# Patient Record
Sex: MALE | Race: BLACK OR AFRICAN AMERICAN
[De-identification: names, ages, dates, MRNs, and addresses within clinical notes are randomized per-mention and may not be internally consistent; named-entity substitution may affect disease eponyms.]

---

## 2022-05-08 ENCOUNTER — HOSPITAL ENCOUNTER (EMERGENCY)
Dept: HOSPITAL 26 - MED | Age: 32
LOS: 1 days | Discharge: PSYCHIATRIC OR SUBSTANCE USE FACILITY | End: 2022-05-09
Payer: COMMERCIAL

## 2022-05-08 VITALS — BODY MASS INDEX: 44.1 KG/M2 | WEIGHT: 315 LBS | HEIGHT: 71 IN

## 2022-05-08 VITALS — DIASTOLIC BLOOD PRESSURE: 75 MMHG | SYSTOLIC BLOOD PRESSURE: 116 MMHG

## 2022-05-08 DIAGNOSIS — F19.10: ICD-10-CM

## 2022-05-08 DIAGNOSIS — Z88.2: ICD-10-CM

## 2022-05-08 DIAGNOSIS — F12.129: ICD-10-CM

## 2022-05-08 DIAGNOSIS — F23: Primary | ICD-10-CM

## 2022-05-08 DIAGNOSIS — F31.9: ICD-10-CM

## 2022-05-08 DIAGNOSIS — F90.9: ICD-10-CM

## 2022-05-08 DIAGNOSIS — R45.851: ICD-10-CM

## 2022-05-08 DIAGNOSIS — Z20.822: ICD-10-CM

## 2022-05-08 DIAGNOSIS — Z88.8: ICD-10-CM

## 2022-05-08 LAB
ALBUMIN FLD-MCNC: 3.5 G/DL (ref 3.4–5)
ANION GAP SERPL CALCULATED.3IONS-SCNC: 9.9 MMOL/L (ref 8–16)
APAP SERPL-MCNC: 2 UG/ML (ref 10–30)
AST SERPL-CCNC: 95 U/L (ref 15–37)
BASOPHILS # BLD AUTO: 0.1 K/UL (ref 0–0.22)
BASOPHILS NFR BLD AUTO: 0.5 % (ref 0–2)
BILIRUB SERPL-MCNC: 1.9 MG/DL (ref 0–1)
BUN SERPL-MCNC: 10 MG/DL (ref 7–18)
CHLORIDE SERPL-SCNC: 105 MMOL/L (ref 98–107)
CO2 SERPL-SCNC: 29.8 MMOL/L (ref 21–32)
CREAT SERPL-MCNC: 1.1 MG/DL (ref 0.6–1.3)
EOSINOPHIL # BLD AUTO: 0.3 K/UL (ref 0–0.4)
EOSINOPHIL NFR BLD AUTO: 3.2 % (ref 0–4)
ERYTHROCYTE [DISTWIDTH] IN BLOOD BY AUTOMATED COUNT: 15.1 % (ref 11.6–13.7)
GFR SERPL CREATININE-BSD FRML MDRD: 100 ML/MIN (ref 90–?)
GLUCOSE SERPL-MCNC: 107 MG/DL (ref 74–106)
HCT VFR BLD AUTO: 38.3 % (ref 36–52)
HGB BLD-MCNC: 12.8 G/DL (ref 12–18)
LYMPHOCYTES # BLD AUTO: 2 K/UL (ref 2–11.5)
LYMPHOCYTES NFR BLD AUTO: 20.2 % (ref 20.5–51.1)
MCH RBC QN AUTO: 30 PG (ref 27–31)
MCHC RBC AUTO-ENTMCNC: 33 G/DL (ref 33–37)
MCV RBC AUTO: 90.2 FL (ref 80–94)
MONOCYTES # BLD AUTO: 1.1 K/UL (ref 0.8–1)
MONOCYTES NFR BLD AUTO: 11.6 % (ref 1.7–9.3)
NEUTROPHILS # BLD AUTO: 6.3 K/UL (ref 1.8–7.7)
NEUTROPHILS NFR BLD AUTO: 64.5 % (ref 42.2–75.2)
PLATELET # BLD AUTO: 247 K/UL (ref 140–450)
POTASSIUM SERPL-SCNC: 3.7 MMOL/L (ref 3.5–5.1)
RBC # BLD AUTO: 4.25 MIL/UL (ref 4.2–6.1)
SALICYLATES SERPL-MCNC: < 2.8 MG/DL (ref 2.8–20)
SODIUM SERPL-SCNC: 141 MMOL/L (ref 136–145)
WBC # BLD AUTO: 9.7 K/UL (ref 4.8–10.8)

## 2022-05-08 PROCEDURE — 84484 ASSAY OF TROPONIN QUANT: CPT

## 2022-05-08 PROCEDURE — 80053 COMPREHEN METABOLIC PANEL: CPT

## 2022-05-08 PROCEDURE — 87426 SARSCOV CORONAVIRUS AG IA: CPT

## 2022-05-08 PROCEDURE — 80305 DRUG TEST PRSMV DIR OPT OBS: CPT

## 2022-05-08 PROCEDURE — 85025 COMPLETE CBC W/AUTO DIFF WBC: CPT

## 2022-05-08 PROCEDURE — 81003 URINALYSIS AUTO W/O SCOPE: CPT

## 2022-05-08 PROCEDURE — 36415 COLL VENOUS BLD VENIPUNCTURE: CPT

## 2022-05-08 PROCEDURE — G0482 DRUG TEST DEF 15-21 CLASSES: HCPCS

## 2022-05-08 PROCEDURE — G0480 DRUG TEST DEF 1-7 CLASSES: HCPCS

## 2022-05-08 PROCEDURE — 99285 EMERGENCY DEPT VISIT HI MDM: CPT

## 2022-05-08 PROCEDURE — 76705 ECHO EXAM OF ABDOMEN: CPT

## 2022-05-08 PROCEDURE — U0003 INFECTIOUS AGENT DETECTION BY NUCLEIC ACID (DNA OR RNA); SEVERE ACUTE RESPIRATORY SYNDROME CORONAVIRUS 2 (SARS-COV-2) (CORONAVIRUS DISEASE [COVID-19]), AMPLIFIED PROBE TECHNIQUE, MAKING USE OF HIGH THROUGHPUT TECHNOLOGIES AS DESCRIBED BY CMS-2020-01-R: HCPCS

## 2022-05-08 PROCEDURE — 93005 ELECTROCARDIOGRAM TRACING: CPT

## 2022-05-08 PROCEDURE — 87635 SARS-COV-2 COVID-19 AMP PRB: CPT

## 2022-05-08 NOTE — NUR
31 Y/O MALE BIB SELF C/O PSYCH EVALUATION, STATED THAT HIS WIFE ENCOURAGED HIM 
TO GET COUNSELING IN THE ER, JALEN HERNANDEZ. PT STATES THAT HE HAS HAD EPISODES OF 
PSYCHOSIS IN THE "PAST FEW DAYS" OF VOICES TELLING HIM TO STAY AND LOVE HIS 
WIFE AND "POSITIVE THOUGHTS" DENIES ANY THOUGHTS OF WANTING TO HARM SELF AND 
OTHERS. A/OX4



ALLERGIC: RISPERIDONE,THERMAZINE, DEPAKOTE

PMH: PSYCHOSIS

## 2022-05-09 VITALS — SYSTOLIC BLOOD PRESSURE: 147 MMHG | DIASTOLIC BLOOD PRESSURE: 79 MMHG

## 2022-05-09 LAB
APPEARANCE UR: CLEAR
BARBITURATES UR QL SCN: NEGATIVE NG/ML
BENZODIAZ UR QL SCN: NEGATIVE NG/ML
BILIRUB UR QL STRIP: NEGATIVE
BZE UR QL SCN: NEGATIVE NG/ML
CANNABINOIDS UR QL SCN: POSITIVE NG/ML
COLOR UR: YELLOW
GLUCOSE UR STRIP-MCNC: NEGATIVE MG/DL
HGB UR QL STRIP: NEGATIVE
LEUKOCYTE ESTERASE UR QL STRIP: NEGATIVE
NITRITE UR QL STRIP: NEGATIVE
OPIATES UR QL SCN: NEGATIVE NG/ML
PCP UR QL SCN: NEGATIVE NG/ML
PH UR STRIP: 6 [PH] (ref 5–9)

## 2022-05-09 NOTE — NUR
PT COMPLAIN OF SLEEPLESSNESS. ANOTHER PT IN ER IS SHOUTING AND AWAKING HIM. HE 
ASKED FOR SOMETHING GOT SLEEP

## 2022-05-09 NOTE — NUR
Received call from Breonna at [facility]. Patient has been accepted at [facility] under Dr. Cameron Hopper

East Unit

[phone]. They would like patient to arrive after 19:30

## 2022-05-09 NOTE — NUR
Patient to be transferred to [facility].  Is being transferred due to 
HIGHER LEVEL OF CARE.  Receiving facility has accepting physician and available 
space. ER physician has signed transfer form.  Patient or responsible party has 
agreed to transfer and signed form.  Patient belongings inventoried and will be 
sent with patient.  Copy of nursing notes, lab reports, EKG, Physicians Orders 
and X-rays to be sent with patient.  Report called to JOSLYN GORDON at receiving 
facility.  [facility] ambulance service has been called for transfer.  ETA is [id number].

## 2022-05-09 NOTE — NUR
Packet hs been fax to the following facilities



[facility]

[facility]

[facility]

## 2022-05-09 NOTE — NUR
PT GROWING ANXIOUS . PT TRIED TO GET BELONGINGS BAG. EMT REDIRECTED PT BACK TO 
ROOM. PT NOW SITTING QUIETLY

## 2022-05-29 ENCOUNTER — HOSPITAL ENCOUNTER (EMERGENCY)
Dept: HOSPITAL 26 - MED | Age: 32
Discharge: HOME | End: 2022-05-29
Payer: COMMERCIAL

## 2022-05-29 VITALS — HEIGHT: 71 IN | BODY MASS INDEX: 44.1 KG/M2 | WEIGHT: 315 LBS

## 2022-05-29 VITALS — DIASTOLIC BLOOD PRESSURE: 95 MMHG | SYSTOLIC BLOOD PRESSURE: 135 MMHG

## 2022-05-29 VITALS — DIASTOLIC BLOOD PRESSURE: 68 MMHG | SYSTOLIC BLOOD PRESSURE: 136 MMHG

## 2022-05-29 DIAGNOSIS — F90.9: ICD-10-CM

## 2022-05-29 DIAGNOSIS — F31.9: Primary | ICD-10-CM

## 2022-05-29 DIAGNOSIS — F20.9: ICD-10-CM

## 2022-05-29 DIAGNOSIS — Z76.0: ICD-10-CM

## 2022-05-29 DIAGNOSIS — Z88.8: ICD-10-CM

## 2022-05-29 DIAGNOSIS — Z88.2: ICD-10-CM

## 2022-05-29 DIAGNOSIS — Z79.899: ICD-10-CM

## 2022-05-29 DIAGNOSIS — F12.90: ICD-10-CM

## 2022-05-29 NOTE — NUR
Patient discharged with v/s stable. Written and verbal after care instructions 
given and explained. 

Patient alert, oriented and verbalized understanding of instructions. 
Ambulatory with steady gait. All questions addressed prior to discharge. ID 
band removed. Patient advised to follow up with PMD. Rx of INVEGA given. 
Patient educated on indication of medication including possible reaction and 
side effects. Opportunity to ask questions provided and answered.